# Patient Record
Sex: MALE | Race: WHITE | NOT HISPANIC OR LATINO | URBAN - METROPOLITAN AREA
[De-identification: names, ages, dates, MRNs, and addresses within clinical notes are randomized per-mention and may not be internally consistent; named-entity substitution may affect disease eponyms.]

---

## 2023-07-13 ENCOUNTER — OFFICE VISIT (OUTPATIENT)
Dept: URBAN - METROPOLITAN AREA CLINIC 40 | Facility: CLINIC | Age: 38
End: 2023-07-13

## 2023-07-13 RX ORDER — DIVALPROEX SODIUM 500 MG/1
TAKE ONE TABLET BY MOUTH TWICE A DAY TABLET, EXTENDED RELEASE ORAL
Qty: 180 UNSPECIFIED | Refills: 0 | Status: ACTIVE | COMMUNITY

## 2023-08-24 ENCOUNTER — OFFICE VISIT (OUTPATIENT)
Dept: URBAN - METROPOLITAN AREA CLINIC 40 | Facility: CLINIC | Age: 38
End: 2023-08-24
Payer: COMMERCIAL

## 2023-08-24 ENCOUNTER — WEB ENCOUNTER (OUTPATIENT)
Dept: URBAN - METROPOLITAN AREA CLINIC 40 | Facility: CLINIC | Age: 38
End: 2023-08-24

## 2023-08-24 VITALS
WEIGHT: 183 LBS | HEART RATE: 82 BPM | BODY MASS INDEX: 25.62 KG/M2 | HEIGHT: 71 IN | DIASTOLIC BLOOD PRESSURE: 82 MMHG | SYSTOLIC BLOOD PRESSURE: 120 MMHG

## 2023-08-24 DIAGNOSIS — R12 HEARTBURN: ICD-10-CM

## 2023-08-24 PROCEDURE — 99203 OFFICE O/P NEW LOW 30 MIN: CPT | Performed by: PHYSICIAN ASSISTANT

## 2023-08-24 RX ORDER — PANTOPRAZOLE SODIUM 40 MG/1
1 TABLET TABLET, DELAYED RELEASE ORAL ONCE A DAY
Qty: 30 TABLET | Refills: 1 | OUTPATIENT
Start: 2023-08-24

## 2023-08-24 RX ORDER — DIVALPROEX SODIUM 500 MG/1
TAKE ONE TABLET BY MOUTH TWICE A DAY TABLET, EXTENDED RELEASE ORAL
Qty: 180 UNSPECIFIED | Refills: 0 | Status: ACTIVE | COMMUNITY

## 2023-08-24 NOTE — HPI-TODAY'S VISIT:
Mr. Woodward is a 38-year-old white male who presents to the office today with complaint of heartburn for several months on and off, initially noted 2 to 3 years ago.  Overall, it has improved with monitoring his diet.  He does admit that tomato-based products seem to reproduce symptoms and possibly certain meats and oils.  He notices with spaghetti and pizza his symptoms are reproduced and if he eats late at night before lying supine.  No nausea, vomiting or dysphagia reported.  Previously he states that he had a cardiovascular work-up for now resolved chest discomfort with a normal EKG and chest x-ray.  He has no shortness of breath or chest pain at this time.  Denies any regular use of NSAIDs.  No use of tobacco or alcohol products.  There is no family history of esophageal, gastric or colonic malignancy.  No past endoscopy or GI work-up.  He has noted relief with as needed OTC omeprazole and Pepcid but has not taken these consistently.  No other complaints today.  Denies change in bowels rectal bleeding or melena.  Overall, appetite is preserved.  Weight stable.  He is a  at a local [employer] here in [place], [place].

## 2023-10-16 ENCOUNTER — DASHBOARD ENCOUNTERS (OUTPATIENT)
Age: 38
End: 2023-10-16

## 2023-10-17 ENCOUNTER — OFFICE VISIT (OUTPATIENT)
Dept: URBAN - METROPOLITAN AREA CLINIC 40 | Facility: CLINIC | Age: 38
End: 2023-10-17

## 2023-10-17 RX ORDER — PANTOPRAZOLE SODIUM 40 MG/1
1 TABLET TABLET, DELAYED RELEASE ORAL ONCE A DAY
Qty: 30 TABLET | Refills: 1 | OUTPATIENT

## 2023-10-17 RX ORDER — PANTOPRAZOLE SODIUM 40 MG/1
1 TABLET TABLET, DELAYED RELEASE ORAL ONCE A DAY
Qty: 30 TABLET | Refills: 1 | Status: ACTIVE | COMMUNITY
Start: 2023-08-24

## 2023-10-17 RX ORDER — DIVALPROEX SODIUM 500 MG/1
TAKE ONE TABLET BY MOUTH TWICE A DAY TABLET, EXTENDED RELEASE ORAL
Qty: 180 UNSPECIFIED | Refills: 0 | Status: ACTIVE | COMMUNITY

## 2023-10-17 NOTE — HPI-TODAY'S VISIT:
Mr. Woodward is a 38-year-old white male who presents to the office today with complaint of heartburn for several months on and off, initially noted 2 to 3 years ago.  Overall, it has improved with monitoring his diet.  He does admit that tomato-based products seem to reproduce symptoms and possibly certain meats and oils.  He notices with spaghetti and pizza his symptoms are reproduced and if he eats late at night before lying supine.  No nausea, vomiting or dysphagia reported.  Previously he states that he had a cardiovascular work-up for now resolved chest discomfort with a normal EKG and chest x-ray.  He has no shortness of breath or chest pain at this time.  Denies any regular use of NSAIDs.  No use of tobacco or alcohol products.  There is no family history of esophageal, gastric or colonic malignancy.  No past endoscopy or GI work-up.  He has noted relief with as needed OTC omeprazole and Pepcid but has not taken these consistently.  No other complaints today.  Denies change in bowels rectal bleeding or melena.  Overall, appetite is preserved.  Weight stable.  He is a  at a local [employer] here in [place], [place].